# Patient Record
Sex: MALE | Race: WHITE | Employment: OTHER | URBAN - METROPOLITAN AREA
[De-identification: names, ages, dates, MRNs, and addresses within clinical notes are randomized per-mention and may not be internally consistent; named-entity substitution may affect disease eponyms.]

---

## 2017-12-28 ENCOUNTER — OFFICE VISIT (OUTPATIENT)
Dept: FAMILY MEDICINE CLINIC | Facility: CLINIC | Age: 67
End: 2017-12-28

## 2017-12-28 VITALS
WEIGHT: 150 LBS | TEMPERATURE: 100 F | HEART RATE: 58 BPM | RESPIRATION RATE: 20 BRPM | SYSTOLIC BLOOD PRESSURE: 104 MMHG | DIASTOLIC BLOOD PRESSURE: 60 MMHG | HEIGHT: 67 IN | OXYGEN SATURATION: 98 % | BODY MASS INDEX: 23.54 KG/M2

## 2017-12-28 DIAGNOSIS — R06.2 WHEEZING: ICD-10-CM

## 2017-12-28 DIAGNOSIS — J40 BRONCHITIS: Primary | ICD-10-CM

## 2017-12-28 PROCEDURE — 99202 OFFICE O/P NEW SF 15 MIN: CPT | Performed by: NURSE PRACTITIONER

## 2017-12-28 RX ORDER — PREDNISONE 20 MG/1
20 TABLET ORAL 2 TIMES DAILY
Qty: 10 TABLET | Refills: 0 | Status: SHIPPED | OUTPATIENT
Start: 2017-12-28 | End: 2018-01-02

## 2017-12-28 RX ORDER — CLOPIDOGREL BISULFATE 75 MG/1
75 TABLET ORAL DAILY
COMMUNITY

## 2017-12-28 RX ORDER — ALBUTEROL SULFATE 90 UG/1
2 AEROSOL, METERED RESPIRATORY (INHALATION) EVERY 4 HOURS PRN
Qty: 1 INHALER | Refills: 0 | Status: SHIPPED | OUTPATIENT
Start: 2017-12-28 | End: 2018-01-11

## 2017-12-28 RX ORDER — AZITHROMYCIN 250 MG/1
TABLET, FILM COATED ORAL
Qty: 6 TABLET | Refills: 0 | Status: SHIPPED | OUTPATIENT
Start: 2017-12-28

## 2017-12-28 RX ORDER — METOPROLOL SUCCINATE 50 MG/1
50 TABLET, EXTENDED RELEASE ORAL DAILY
COMMUNITY

## 2017-12-28 NOTE — PATIENT INSTRUCTIONS
Humidifier in room  Sleep propped  Push fluids  Limit dairy  Mucinex as directed  Follow up in 3 days for worsening symptoms or no improvement    Constantino Tom (Bronchitis) (Doros?y: Halina Logan (Abx Tx)    Susie Patch (BRONCHITIS) 5. Dost?pne bez recepty carol rose (cough medicines) zawinadeemj?ce \"dekstrometorfan\" (dextromethorphan) (takie, norris Robitussin DM) orbrigitte vasquezcikenney?anayeli (decongestants) (Actifed b?d?  Sudafed) mog? molly? rose (cough) oraz obbonnie?k (congestion) © 0336-8423 [employer] [address] [facility] [place], [facility] [place]. All rights reserved. This information is not intended as a substitute for professional medical care. Always follow your healthcare professional's instructions.

## 2017-12-28 NOTE — PROGRESS NOTES
CHIEF COMPLAINT:   Patient presents with:  Chest Congestion: chest hurts with coughing,fever  and sore thtoat x 1 wk        HPI:   Mary Negron is a 74year old male who presents for cough for  1  weeks.   Cough started gradually and is described as GI: Denies N/V/C/D or abdominal pain.       EXAM:   /60   Pulse 58   Temp 100.3 °F (37.9 °C) (Oral)   Resp 20   Ht 67\"   Wt 150 lb   SpO2 98%   BMI 23.49 kg/m²   GENERAL: well developed, well nourished,in no apparent distress  SKIN: no rashes, no ernesto Pablo Kc (BRONCHITIS) jest infekcj? (infection) annegiovanni [facility]). Cz?sto pojawia si? podczas zwyk?ego przezi?bienia (common cold).  Objawy obejmuj? kaszel z obecno?ci? wydzieliny (cough with mucus) (flegm?), oraz nisk? gor?czk? 5. Dost?pne bez recepty carol rose (cough medicines) zawinadeemj?ce \"dekstrometorfan\" (dextromethorphan) (takie, norris Robitussin DM) orbrigitte vasquezcikenney?anayeli (decongestants) (Actifed b?d?  Sudafed) mog? molly? rose (cough) oraz obbonnie?k (congestion) © 9336-2486 [employer] [address] [facility] [place], [facility] [employer] [place]. All rights reserved. This information is not intended as a substitute for professional medical care. Always follow your healthcare professional's instructions.             The